# Patient Record
Sex: MALE | Race: WHITE | NOT HISPANIC OR LATINO | URBAN - METROPOLITAN AREA
[De-identification: names, ages, dates, MRNs, and addresses within clinical notes are randomized per-mention and may not be internally consistent; named-entity substitution may affect disease eponyms.]

---

## 2017-02-02 ENCOUNTER — IMPORTED ENCOUNTER (OUTPATIENT)
Dept: URBAN - METROPOLITAN AREA CLINIC 9 | Facility: CLINIC | Age: 72
End: 2017-02-02

## 2017-02-22 ENCOUNTER — IMPORTED ENCOUNTER (OUTPATIENT)
Dept: URBAN - METROPOLITAN AREA CLINIC 9 | Facility: CLINIC | Age: 72
End: 2017-02-22

## 2017-03-29 NOTE — PATIENT DISCUSSION
PHOTOGRAPHS: I have reviewed the external ocular photographs of this patient which show the following: significant ptosis both upper eyelids.

## 2017-03-29 NOTE — PATIENT DISCUSSION
Ptosis Counseling:  I have examined the patient and reviewed the photos and visual fields. The upper eyelid margin in ptosis obstructs the visual axis causing  impairment of the peripheral visual field which improves with taping. I have discussed with the patient the option of levator advancement surgery to lift the upper eyelid position and improve the functional visual field. We have discussed the risks and benefits of the surgery at length as well as the location of the incision and the recovery process. The patient understands the surgery, has had all questions answered   and desires to proceed with the surgery as explained.

## 2017-06-22 ENCOUNTER — IMPORTED ENCOUNTER (OUTPATIENT)
Dept: URBAN - METROPOLITAN AREA CLINIC 9 | Facility: CLINIC | Age: 72
End: 2017-06-22

## 2017-10-30 ENCOUNTER — IMPORTED ENCOUNTER (OUTPATIENT)
Dept: URBAN - METROPOLITAN AREA CLINIC 9 | Facility: CLINIC | Age: 72
End: 2017-10-30

## 2017-12-06 ENCOUNTER — IMPORTED ENCOUNTER (OUTPATIENT)
Dept: URBAN - METROPOLITAN AREA CLINIC 9 | Facility: CLINIC | Age: 72
End: 2017-12-06

## 2018-01-17 ENCOUNTER — IMPORTED ENCOUNTER (OUTPATIENT)
Dept: URBAN - METROPOLITAN AREA CLINIC 9 | Facility: CLINIC | Age: 73
End: 2018-01-17

## 2018-04-12 ENCOUNTER — IMPORTED ENCOUNTER (OUTPATIENT)
Dept: URBAN - METROPOLITAN AREA CLINIC 9 | Facility: CLINIC | Age: 73
End: 2018-04-12

## 2018-07-05 ENCOUNTER — IMPORTED ENCOUNTER (OUTPATIENT)
Dept: URBAN - METROPOLITAN AREA CLINIC 9 | Facility: CLINIC | Age: 73
End: 2018-07-05

## 2018-11-08 ENCOUNTER — IMPORTED ENCOUNTER (OUTPATIENT)
Dept: URBAN - METROPOLITAN AREA CLINIC 9 | Facility: CLINIC | Age: 73
End: 2018-11-08

## 2018-12-05 ENCOUNTER — IMPORTED ENCOUNTER (OUTPATIENT)
Dept: URBAN - METROPOLITAN AREA CLINIC 9 | Facility: CLINIC | Age: 73
End: 2018-12-05

## 2019-01-17 ENCOUNTER — IMPORTED ENCOUNTER (OUTPATIENT)
Dept: URBAN - METROPOLITAN AREA CLINIC 9 | Facility: CLINIC | Age: 74
End: 2019-01-17

## 2019-04-11 ENCOUNTER — IMPORTED ENCOUNTER (OUTPATIENT)
Dept: URBAN - METROPOLITAN AREA CLINIC 9 | Facility: CLINIC | Age: 74
End: 2019-04-11

## 2019-05-23 ENCOUNTER — IMPORTED ENCOUNTER (OUTPATIENT)
Dept: URBAN - METROPOLITAN AREA CLINIC 9 | Facility: CLINIC | Age: 74
End: 2019-05-23

## 2019-06-05 ENCOUNTER — IMPORTED ENCOUNTER (OUTPATIENT)
Dept: URBAN - METROPOLITAN AREA CLINIC 9 | Facility: CLINIC | Age: 74
End: 2019-06-05

## 2019-06-05 NOTE — PATIENT DISCUSSION
PHOTOGRAPHS: I have reviewed the external ocular photographs of this patient which show the following: significant ptosis both upper eyelids and a lesion on the right lower eyelid.

## 2019-06-05 NOTE — PATIENT DISCUSSION
[facility] Visual Field 36 point screen: I have reviewed the visual fields both taped and untaped on this patient done by Dr. Radha Chandler which demonstrate significant obstruction of the patient's peripheral visual field on both eyes.

## 2019-07-31 ENCOUNTER — IMPORTED ENCOUNTER (OUTPATIENT)
Dept: URBAN - METROPOLITAN AREA CLINIC 9 | Facility: CLINIC | Age: 74
End: 2019-07-31

## 2019-09-17 NOTE — PATIENT DISCUSSION
Resume normal activity. Resume any medications that were discontinued for surgery. Sutures removed without difficulty. Artificial tears prn burning/itching/blurry vision. Wash incisions/ lash line once daily with baby shampoo. Reviewed skin care and sun avoidance at length. Sunscreen given. Follow up in one month.

## 2019-09-30 ENCOUNTER — IMPORTED ENCOUNTER (OUTPATIENT)
Dept: URBAN - METROPOLITAN AREA CLINIC 9 | Facility: CLINIC | Age: 74
End: 2019-09-30

## 2019-10-22 NOTE — PATIENT DISCUSSION
Patient is continuing to heal well following surgery. She presents w/ a small fusion of the right lower eyelid today. Discussed the r/b of opening the fusion today. Patient wished to proceed. The fusion was open today with a sterile Umberto scissor. Reviewed skin care and sun avoidance. Recommend applying antibiotic ointment to the area. Eye cream given. Follow up in two weeks.

## 2019-11-08 NOTE — PATIENT DISCUSSION
Patient is continuing to heal well following surgery. The fusion that was opened last time is healing nicely. Patient presents w/ more swelling than is typical.  Assured the patient that the swelling should improve over time. Recommend patient follow up in 3 months for continued observation.

## 2020-02-27 ENCOUNTER — IMPORTED ENCOUNTER (OUTPATIENT)
Dept: URBAN - METROPOLITAN AREA CLINIC 9 | Facility: CLINIC | Age: 75
End: 2020-02-27

## 2020-06-04 ENCOUNTER — IMPORTED ENCOUNTER (OUTPATIENT)
Dept: URBAN - METROPOLITAN AREA CLINIC 9 | Facility: CLINIC | Age: 75
End: 2020-06-04

## 2020-06-17 ENCOUNTER — IMPORTED ENCOUNTER (OUTPATIENT)
Dept: URBAN - METROPOLITAN AREA CLINIC 9 | Facility: CLINIC | Age: 75
End: 2020-06-17

## 2020-09-24 ENCOUNTER — IMPORTED ENCOUNTER (OUTPATIENT)
Dept: URBAN - METROPOLITAN AREA CLINIC 9 | Facility: CLINIC | Age: 75
End: 2020-09-24

## 2020-12-17 ENCOUNTER — IMPORTED ENCOUNTER (OUTPATIENT)
Dept: URBAN - METROPOLITAN AREA CLINIC 9 | Facility: CLINIC | Age: 75
End: 2020-12-17

## 2021-03-11 ENCOUNTER — IMPORTED ENCOUNTER (OUTPATIENT)
Dept: URBAN - METROPOLITAN AREA CLINIC 9 | Facility: CLINIC | Age: 76
End: 2021-03-11

## 2021-06-08 ENCOUNTER — IMPORTED ENCOUNTER (OUTPATIENT)
Dept: URBAN - METROPOLITAN AREA CLINIC 9 | Facility: CLINIC | Age: 76
End: 2021-06-08

## 2021-07-15 ENCOUNTER — IMPORTED ENCOUNTER (OUTPATIENT)
Dept: URBAN - METROPOLITAN AREA CLINIC 9 | Facility: CLINIC | Age: 76
End: 2021-07-15

## 2021-09-13 ENCOUNTER — IMPORTED ENCOUNTER (OUTPATIENT)
Dept: URBAN - METROPOLITAN AREA CLINIC 9 | Facility: CLINIC | Age: 76
End: 2021-09-13

## 2021-09-13 ENCOUNTER — PREPPED CHART (OUTPATIENT)
Dept: URBAN - METROPOLITAN AREA CLINIC 17 | Facility: CLINIC | Age: 76
End: 2021-09-13

## 2021-09-13 PROBLEM — H40.1131: Noted: 2021-09-13

## 2021-09-13 PROBLEM — H35.373: Noted: 2021-09-13

## 2021-10-18 ASSESSMENT — VISUAL ACUITY
OS_SC: 20/60 SN
OS_SC: 20/80 SN
OS_SC: 20/40 SN
OS_CC: 20/40 -2 SN
OD_SC: 20/50 SN
OD_SC: 20/50 + SN
OS_CC: 20/40 SN
OD_PH: 20/25 -2 SN
OS_SC: 20/70 SN
OD_SC: 20/30 -2 SN
OD_SC: 20/40 -2 SN
OS_SC: 20/50 SN
OS_PH: 20/40 SN
OS_PH: 20/40 SN
OS_PH: 20/50 + SN
OS_SC: 20/40 SN
OD_SC: 20/40 -2 SN
OS_SC: 20/40 -2 SN
OS_SC: 20/40 -2 SN
OS_SC: 20/50 SN
OS_SC: 20/60 SN
OD_SC: 20/40 + SN
OS_SC: 20/50 SN
OD_SC: 20/40 SN
OS_SC: 20/70 + SN
OS_PH: 20/40 -2 SN
OD_SC: 20/40 SN
OD_SC: 20/30 SN
OD_PH: 20/40 SN
OD_SC: 20/30 SN
OS_SC: 20/50 -2 SN
OS_CC: 20/30 SN
OD_CC: 20/40 -2 SN
OS_PH: 20/50 - SN
OD_SC: 20/30 -2 SN
OD_SC: 20/50 SN
OD_CC: 20/25 - SN
OD_PH: 20/30 SN
OD_SC: 20/40 -2 SN
OD_SC: 20/25 -2 SN
OS_SC: 20/50 SN
OD_SC: 20/30 SN
OS_SC: 20/50 SN
OD_PH: 20/40 + SN
OS_SC: 20/50 SN
OS_SC: 20/50 -2 SN
OD_CC: 20/40 -2 SN
OD_SC: 20/50 SN
OD_SC: 20/30 -2 SN
OD_SC: 20/40 SN
OS_SC: 20/50 SN
OS_SC: 20/50 - SN
OD_SC: 20/40 SN
OS_SC: 20/25 SN
OS_SC: 20/40 -2 SN
OS_PH: 20/40 SN
OD_SC: 20/40 -2 SN

## 2021-10-18 ASSESSMENT — TONOMETRY
OS_IOP_MMHG: 13
OD_IOP_MMHG: 16
OD_IOP_MMHG: 17
OS_IOP_MMHG: 18
OD_IOP_MMHG: 16
OS_IOP_MMHG: 13
OD_IOP_MMHG: 17
OS_IOP_MMHG: 18
OD_IOP_MMHG: 16
OS_IOP_MMHG: 15
OD_IOP_MMHG: 20
OS_IOP_MMHG: 19
OS_IOP_MMHG: 16
OS_IOP_MMHG: 16
OS_IOP_MMHG: 14
OD_IOP_MMHG: 13
OD_IOP_MMHG: 14
OD_IOP_MMHG: 14
OS_IOP_MMHG: 16
OD_IOP_MMHG: 15
OD_IOP_MMHG: 15
OS_IOP_MMHG: 16
OS_IOP_MMHG: 15
OD_IOP_MMHG: 18
OS_IOP_MMHG: 14
OS_IOP_MMHG: 13
OD_IOP_MMHG: 15
OS_IOP_MMHG: 15
OS_IOP_MMHG: 16
OS_IOP_MMHG: 17
OD_IOP_MMHG: 17
OD_IOP_MMHG: 17
OD_IOP_MMHG: 11
OD_IOP_MMHG: 17
OS_IOP_MMHG: 18
OS_IOP_MMHG: 16
OS_IOP_MMHG: 16
OS_IOP_MMHG: 15
OD_IOP_MMHG: 15
OD_IOP_MMHG: 15
OD_IOP_MMHG: 18

## 2021-10-18 ASSESSMENT — PACHYMETRY
OD_CT_UM: 507.0
OS_CT_UM: 528.0

## 2022-01-13 ENCOUNTER — IOP CHECK (OUTPATIENT)
Dept: URBAN - METROPOLITAN AREA CLINIC 12 | Facility: CLINIC | Age: 77
End: 2022-01-13

## 2022-01-13 DIAGNOSIS — H04.123: ICD-10-CM

## 2022-01-13 DIAGNOSIS — H40.1131: ICD-10-CM

## 2022-01-13 PROCEDURE — 92133 CPTRZD OPH DX IMG PST SGM ON: CPT

## 2022-01-13 PROCEDURE — 92020 GONIOSCOPY: CPT

## 2022-01-13 PROCEDURE — 99213 OFFICE O/P EST LOW 20 MIN: CPT

## 2022-01-13 ASSESSMENT — VISUAL ACUITY
OD_SC: 20/50-1
OS_SC: 20/80

## 2022-01-13 ASSESSMENT — TONOMETRY
OS_IOP_MMHG: 18
OD_IOP_MMHG: 17

## 2022-03-24 ENCOUNTER — ESTABLISHED PATIENT (OUTPATIENT)
Dept: URBAN - METROPOLITAN AREA CLINIC 12 | Facility: CLINIC | Age: 77
End: 2022-03-24

## 2022-03-24 DIAGNOSIS — H26.493: ICD-10-CM

## 2022-03-24 DIAGNOSIS — H43.813: ICD-10-CM

## 2022-03-24 DIAGNOSIS — E11.9: ICD-10-CM

## 2022-03-24 DIAGNOSIS — H40.1131: ICD-10-CM

## 2022-03-24 DIAGNOSIS — H04.123: ICD-10-CM

## 2022-03-24 DIAGNOSIS — H35.373: ICD-10-CM

## 2022-03-24 PROCEDURE — 92134 CPTRZ OPH DX IMG PST SGM RTA: CPT

## 2022-03-24 PROCEDURE — 99214 OFFICE O/P EST MOD 30 MIN: CPT

## 2022-03-24 ASSESSMENT — VISUAL ACUITY
OD_SC: 20/50
OD_PH: 20/30-2
OS_PH: 20/40-2
OS_SC: 20/70-2

## 2022-03-24 ASSESSMENT — TONOMETRY
OD_IOP_MMHG: 15
OS_IOP_MMHG: 14

## 2022-06-28 ENCOUNTER — DIAGNOSTICS ONLY (OUTPATIENT)
Dept: URBAN - METROPOLITAN AREA CLINIC 17 | Facility: CLINIC | Age: 77
End: 2022-06-28

## 2022-06-28 DIAGNOSIS — H40.1131: ICD-10-CM

## 2022-06-28 PROCEDURE — 92083 EXTENDED VISUAL FIELD XM: CPT

## 2022-06-28 PROCEDURE — 92133 CPTRZD OPH DX IMG PST SGM ON: CPT

## 2022-07-07 RX ORDER — CARVEDILOL 6.25 MG/1
TABLET ORAL
COMMUNITY

## 2022-07-07 RX ORDER — MELOXICAM 7.5 MG/1
TABLET ORAL
COMMUNITY

## 2022-07-07 RX ORDER — SIMVASTATIN 10 MG
TABLET ORAL
COMMUNITY

## 2022-07-07 RX ORDER — HYDROCODONE BITARTRATE AND ACETAMINOPHEN 5; 325 MG/1; MG/1
TABLET ORAL
COMMUNITY

## 2022-07-07 RX ORDER — LISINOPRIL 20 MG/1
TABLET ORAL
COMMUNITY

## 2022-07-07 RX ORDER — FENOFIBRATE 160 MG/1
TABLET ORAL
COMMUNITY

## 2022-07-07 RX ORDER — ASPIRIN 325 MG
TABLET ORAL
COMMUNITY

## 2022-07-07 RX ORDER — GLIMEPIRIDE 4 MG/1
TABLET ORAL
COMMUNITY

## 2022-07-07 RX ORDER — BIMATOPROST 0.01 %
DROPS OPHTHALMIC (EYE)
COMMUNITY

## 2022-07-07 RX ORDER — SITAGLIPTIN AND METFORMIN HYDROCHLORIDE 1000; 50 MG/1; MG/1
TABLET, FILM COATED ORAL
COMMUNITY

## 2022-07-07 RX ORDER — CLOTRIMAZOLE AND BETAMETHASONE DIPROPIONATE 10; .64 MG/G; MG/G
CREAM TOPICAL
COMMUNITY

## 2022-07-14 ENCOUNTER — FOLLOW UP (OUTPATIENT)
Dept: URBAN - METROPOLITAN AREA CLINIC 12 | Facility: CLINIC | Age: 77
End: 2022-07-14

## 2022-07-14 DIAGNOSIS — H04.123: ICD-10-CM

## 2022-07-14 DIAGNOSIS — H40.1131: ICD-10-CM

## 2022-07-14 PROCEDURE — 99213 OFFICE O/P EST LOW 20 MIN: CPT

## 2022-07-14 ASSESSMENT — TONOMETRY
OS_IOP_MMHG: 17
OD_IOP_MMHG: 16

## 2022-07-14 ASSESSMENT — VISUAL ACUITY
OD_CC: 20/40
OS_CC: 20/50
OU_CC: 20/40

## 2022-10-02 PROBLEM — E11.9 TYPE 2 DIABETES MELLITUS WITHOUT COMPLICATION (HCC): Status: ACTIVE | Noted: 2022-10-02

## 2022-10-02 PROBLEM — E11.8 DIABETIC FOOT (HCC): Status: ACTIVE | Noted: 2022-10-02

## 2022-10-02 PROBLEM — R26.2 DIFFICULTY WALKING: Status: ACTIVE | Noted: 2022-10-02

## 2022-10-02 PROBLEM — M79.609 PAIN IN LIMB: Status: ACTIVE | Noted: 2022-10-02

## 2022-10-02 PROBLEM — B35.1 DERMATOPHYTOSIS OF NAIL: Status: ACTIVE | Noted: 2022-10-02

## 2022-10-02 PROBLEM — S90.456A: Status: ACTIVE | Noted: 2022-10-02

## 2022-10-02 PROBLEM — M25.561 RIGHT KNEE PAIN: Status: ACTIVE | Noted: 2022-10-02

## 2022-10-02 PROBLEM — M17.9 DJD (DEGENERATIVE JOINT DISEASE) OF KNEE: Status: ACTIVE | Noted: 2022-10-02

## 2022-10-02 PROBLEM — L08.9: Status: ACTIVE | Noted: 2022-10-02

## 2022-10-20 ENCOUNTER — ESTABLISHED PATIENT (OUTPATIENT)
Dept: URBAN - METROPOLITAN AREA CLINIC 12 | Facility: CLINIC | Age: 77
End: 2022-10-20

## 2022-10-20 DIAGNOSIS — H35.373: ICD-10-CM

## 2022-10-20 DIAGNOSIS — H43.813: ICD-10-CM

## 2022-10-20 DIAGNOSIS — H26.493: ICD-10-CM

## 2022-10-20 DIAGNOSIS — H40.1131: ICD-10-CM

## 2022-10-20 DIAGNOSIS — H04.123: ICD-10-CM

## 2022-10-20 PROCEDURE — 99214 OFFICE O/P EST MOD 30 MIN: CPT

## 2022-10-20 PROCEDURE — 92134 CPTRZ OPH DX IMG PST SGM RTA: CPT

## 2022-10-20 ASSESSMENT — VISUAL ACUITY
OS_CC: 20/40-2
OD_CC: 20/40+1

## 2022-10-20 ASSESSMENT — TONOMETRY
OD_IOP_MMHG: 16
OS_IOP_MMHG: 18

## 2023-01-26 ENCOUNTER — ESTABLISHED PATIENT (OUTPATIENT)
Dept: URBAN - METROPOLITAN AREA CLINIC 12 | Facility: CLINIC | Age: 78
End: 2023-01-26

## 2023-01-26 DIAGNOSIS — H04.123: ICD-10-CM

## 2023-01-26 DIAGNOSIS — H26.493: ICD-10-CM

## 2023-01-26 DIAGNOSIS — H40.1131: ICD-10-CM

## 2023-01-26 DIAGNOSIS — H43.813: ICD-10-CM

## 2023-01-26 DIAGNOSIS — H35.373: ICD-10-CM

## 2023-01-26 PROCEDURE — 92134 CPTRZ OPH DX IMG PST SGM RTA: CPT

## 2023-01-26 PROCEDURE — 99214 OFFICE O/P EST MOD 30 MIN: CPT

## 2023-01-26 ASSESSMENT — VISUAL ACUITY
OD_SC: 20/40
OS_SC: 20/60

## 2023-01-26 ASSESSMENT — TONOMETRY
OD_IOP_MMHG: 18
OS_IOP_MMHG: 18

## 2023-05-04 ENCOUNTER — ESTABLISHED PATIENT (OUTPATIENT)
Dept: URBAN - METROPOLITAN AREA CLINIC 12 | Facility: CLINIC | Age: 78
End: 2023-05-04

## 2023-05-04 DIAGNOSIS — H40.1131: ICD-10-CM

## 2023-05-04 DIAGNOSIS — H04.123: ICD-10-CM

## 2023-05-04 PROCEDURE — 99213 OFFICE O/P EST LOW 20 MIN: CPT

## 2023-05-04 ASSESSMENT — VISUAL ACUITY
OS_CC: 20/50-2
OD_CC: 20/50-1

## 2023-05-04 ASSESSMENT — TONOMETRY
OD_IOP_MMHG: 16
OS_IOP_MMHG: 17

## 2023-06-27 ENCOUNTER — TECH ONLY (OUTPATIENT)
Dept: URBAN - METROPOLITAN AREA CLINIC 17 | Facility: CLINIC | Age: 78
End: 2023-06-27

## 2023-06-27 DIAGNOSIS — H40.1131: ICD-10-CM

## 2023-06-27 PROCEDURE — 92083 EXTENDED VISUAL FIELD XM: CPT

## 2023-08-24 ENCOUNTER — FOLLOW UP (OUTPATIENT)
Dept: URBAN - METROPOLITAN AREA CLINIC 12 | Facility: CLINIC | Age: 78
End: 2023-08-24

## 2023-08-24 DIAGNOSIS — H04.123: ICD-10-CM

## 2023-08-24 DIAGNOSIS — H40.1131: ICD-10-CM

## 2023-08-24 PROCEDURE — 99213 OFFICE O/P EST LOW 20 MIN: CPT

## 2023-08-24 ASSESSMENT — TONOMETRY
OD_IOP_MMHG: 20
OS_IOP_MMHG: 19

## 2023-08-24 ASSESSMENT — VISUAL ACUITY
OD_CC: 20/40
OS_CC: 20/50-2

## 2023-11-16 ENCOUNTER — ESTABLISHED PATIENT (OUTPATIENT)
Dept: URBAN - METROPOLITAN AREA CLINIC 12 | Facility: CLINIC | Age: 78
End: 2023-11-16

## 2023-11-16 DIAGNOSIS — H43.813: ICD-10-CM

## 2023-11-16 DIAGNOSIS — H35.373: ICD-10-CM

## 2023-11-16 DIAGNOSIS — E11.9: ICD-10-CM

## 2023-11-16 PROCEDURE — 99214 OFFICE O/P EST MOD 30 MIN: CPT

## 2023-11-16 PROCEDURE — 92134 CPTRZ OPH DX IMG PST SGM RTA: CPT

## 2023-11-16 ASSESSMENT — TONOMETRY
OS_IOP_MMHG: 15
OD_IOP_MMHG: 16

## 2023-11-16 ASSESSMENT — VISUAL ACUITY
OD_CC: 20/40
OS_CC: 20/60

## 2024-03-21 ENCOUNTER — FOLLOW UP (OUTPATIENT)
Dept: URBAN - METROPOLITAN AREA CLINIC 12 | Facility: CLINIC | Age: 79
End: 2024-03-21

## 2024-03-21 DIAGNOSIS — H04.123: ICD-10-CM

## 2024-03-21 DIAGNOSIS — H40.1131: ICD-10-CM

## 2024-03-21 PROCEDURE — 99213 OFFICE O/P EST LOW 20 MIN: CPT

## 2024-03-21 ASSESSMENT — TONOMETRY
OS_IOP_MMHG: 16
OD_IOP_MMHG: 14

## 2024-03-21 ASSESSMENT — VISUAL ACUITY
OS_SC: 20/200
OD_SC: 20/50
OS_PH: 20/80